# Patient Record
Sex: MALE
[De-identification: names, ages, dates, MRNs, and addresses within clinical notes are randomized per-mention and may not be internally consistent; named-entity substitution may affect disease eponyms.]

---

## 2021-06-15 ENCOUNTER — HOSPITAL ENCOUNTER (EMERGENCY)
Dept: HOSPITAL 8 - ED | Age: 36
Discharge: HOME | End: 2021-06-15
Payer: SELF-PAY

## 2021-06-15 VITALS — DIASTOLIC BLOOD PRESSURE: 72 MMHG | SYSTOLIC BLOOD PRESSURE: 129 MMHG

## 2021-06-15 VITALS — BODY MASS INDEX: 26.89 KG/M2 | WEIGHT: 167.33 LBS | HEIGHT: 66 IN

## 2021-06-15 DIAGNOSIS — N43.2: Primary | ICD-10-CM

## 2021-06-15 DIAGNOSIS — N50.82: ICD-10-CM

## 2021-06-15 PROCEDURE — 99284 EMERGENCY DEPT VISIT MOD MDM: CPT

## 2021-06-15 PROCEDURE — 76870 US EXAM SCROTUM: CPT

## 2021-06-15 NOTE — NUR
pt arrived to ER with a chief complaint of penis and testicle pain, pt states 
he has no discharge and has had no swelling, pt stated that he had sex last 
week and the condom wasnt all the way on and since then has had pain in his 
testicles, MD was in room at the time and examined pts genital area and no 
lesions were noted, no swelling, and no discharge, and pt stated he does not 
have pain upon urination.
